# Patient Record
Sex: FEMALE | Race: WHITE | Employment: FULL TIME | URBAN - METROPOLITAN AREA
[De-identification: names, ages, dates, MRNs, and addresses within clinical notes are randomized per-mention and may not be internally consistent; named-entity substitution may affect disease eponyms.]

---

## 2018-08-02 ENCOUNTER — OFFICE VISIT (OUTPATIENT)
Dept: INTERNAL MEDICINE CLINIC | Age: 48
End: 2018-08-02

## 2018-08-02 VITALS
HEART RATE: 62 BPM | RESPIRATION RATE: 18 BRPM | BODY MASS INDEX: 23.14 KG/M2 | WEIGHT: 138.9 LBS | TEMPERATURE: 98 F | SYSTOLIC BLOOD PRESSURE: 96 MMHG | DIASTOLIC BLOOD PRESSURE: 62 MMHG | HEIGHT: 65 IN | OXYGEN SATURATION: 98 %

## 2018-08-02 DIAGNOSIS — Z00.00 PREVENTATIVE HEALTH CARE: Primary | ICD-10-CM

## 2018-08-02 NOTE — PROGRESS NOTES
1. Have you been to the ER, urgent care clinic since your last visit? Hospitalized since your last visit? No 
 
2. Have you seen or consulted any other health care providers outside of the [facility] since your last visit? Include any pap smears or colon screening. No 
 
Complaints of clogged ears, blurry vision and calluses on L&R foot

## 2018-08-02 NOTE — MR AVS SNAPSHOT
[address] [place] [address]. [address] [id number] 
[phone] Patient: Rupinder Chase MRN: [id number] WMI:[id number] Visit Information Date & Time Provider Department Dept. Phone Encounter #  
 8/2/2018 10:00 AM Mario Keller [facility] [place] [facility] [id number] Follow-up Instructions Return in about 1 year (around 8/2/2019). Follow-up and Disposition History Upcoming Health Maintenance Date Due  
 PAP AKA CERVICAL CYTOLOGY 2/10/1991 Influenza Age 5 to Adult 8/1/2018 DTaP/Tdap/Td series (1 - Tdap) 8/2/2019* *Topic was postponed. The date shown is not the original due date. Allergies as of 8/2/2018  Review Complete On: 8/2/2018 By: Nola Rodney MD  
 No Known Allergies Current Immunizations  Never Reviewed No immunizations on file. Not reviewed this visit You Were Diagnosed With   
  
 Codes Comments Preventative health care    -  Primary ICD-10-CM: Z00.00 ICD-9-CM: V70.0 Vitals BP Pulse Temp Resp Height(growth percentile) Weight(growth percentile) 96/62 62 98 °F (36.7 °C) (Oral) 18 5' 5\" (1.651 m) 138 lb 14.4 oz (63 kg) LMP SpO2 BMI Smoking Status 07/26/2018 98% 23.11 kg/m2 Never Smoker Vitals History BMI and BSA Data Body Mass Index Body Surface Area  
 23.11 kg/m 2 1.7 m 2 Preferred Pharmacy Pharmacy Name Phone [facility] [phone] Your Updated Medication List  
  
   
This list is accurate as of 8/2/18  1:22 PM.  Always use your most recent med list.  
  
  
  
  
 CALCIUM PO Take  by mouth. GLUCOSAMINE COMPLEX PO Take  by mouth. MULTI-VITAMIN PO Take  by mouth. We Performed the Following CBC WITH AUTOMATED DIFF [83724 CPT(R)] COLLECTION VENOUS BLOOD,VENIPUNCTURE [id number] CPT(R)] CORTISOL [id number] CPT(R)] DHEA [09394 CPT(R)] ESTRADIOL [id number] CPT(R)] FOLLICLE STIMULATING HORMONE [66661 CPT(R)] HEMOGLOBIN A1C WITH EAG [06717 CPT(R)] LIPID PANEL [75915 CPT(R)] METABOLIC PANEL, COMPREHENSIVE [79312 CPT(R)] PROGESTERONE [id number] CPT(R)] REFERRAL TO OPHTHALMOLOGY [REF57 Custom] TESTOSTERONE, TOTAL, FEMALE/CHILD [id number] CPT(R)] TSH 3RD GENERATION [52273 CPT(R)] URINALYSIS W/ RFLX MICROSCOPIC [34745 CPT(R)] VITAMIN D, 1, 25 DIHYDROXY [86350 CPT(R)] Follow-up Instructions Return in about 1 year (around 8/2/2019). To-Do List   
 08/02/2018 Imaging:  CRISTOBAL MAMMO BI SCREENING INCL CAD Referral Information Referral ID Referred By Referred To  
  
 [id number] Major Pester E Not Available Visits Status Start Date End Date 1 New Request 8/2/18 8/2/19 If your referral has a status of pending review or denied, additional information will be sent to support the outcome of this decision. Introducing \[facility] [place] [facility]\"" & HEALTH SERVICES! [facility] introduces Flinqer patient portal. Now you can access parts of your medical record, email your doctor's office, and request medication refills online. 1. In your internet browser, go to [email or web address] 2. Click on the First Time User? Click Here link in the Sign In box. You will see the New Member Sign Up page. 3. Enter your Flinqer Access Code exactly as it appears below. You will not need to use this code after youve completed the sign-up process. If you do not sign up before the expiration date, you must request a new code. · Flinqer Access Code: [email or web address] Expires: 10/31/2018  1:22 PM 
 
4. Enter the last four digits of your Social Security Number (xxxx) and Date of Birth (mm/dd/yyyy) as indicated and click Submit. You will be taken to the next sign-up page. 5. Create a CPXi ID. This will be your CPXi login ID and cannot be changed, so think of one that is secure and easy to remember. 6. Create a CPXi password. You can change your password at any time. 7. Enter your Password Reset Question and Answer. This can be used at a later time if you forget your password. 8. Enter your e-mail address. You will receive e-mail notification when new information is available in [address] 9. Click Sign Up. You can now view and download portions of your medical record. 10. Click the Download Summary menu link to download a portable copy of your medical information. If you have questions, please visit the Frequently Asked Questions section of the CPXi website. Remember, CPXi is NOT to be used for urgent needs. For medical emergencies, dial 911. Now available from your iPhone and Android! Please provide this summary of care documentation to your next provider. Your primary care clinician is listed as Kyaw Hodges. If you have any questions after today's visit, please call [phone].

## 2018-08-02 NOTE — PROGRESS NOTES
[facility] Charles Ramsey MD, [address] [address] Phone:  [phone]  Fax: [phone] Chief Complaint Patient presents with [address] [place] [address] SUBJECTIVE: 
 
Patrisha Opitz is a 50 y.o. female  Dictation on: 08/02/2018 11:53 AM by: Gerardo Johnson [[id number]] Dictation on: 08/02/2018 11:55 AM by: Gerardo Johnson [[id number]] Current Outpatient Prescriptions Medication Sig Dispense Refill  MULTIVITAMINS WITH FLUORIDE (MULTI-VITAMIN PO) Take  by mouth.  CALCIUM PO Take  by mouth.  GLUCOSAMINE HCL/GLUC ALMEIDA (GLUCOSAMINE COMPLEX PO) Take  by mouth. Past Medical History:  
Diagnosis Date  Abdominal pain, other specified site 9/25/2012  [facility] care  S/P laparoscopic appendectomy 11/7/2012 Past Surgical History:  
Procedure Laterality Date  HX APPENDECTOMY  8/18/12  
 laparocopic  HX OTHER SURGICAL    
 tonsillectomy No Known Allergies REVIEW OF SYSTEMS: 
General: negative for - chills or fever ENT: negative for - headaches, nasal congestion or tinnitus Respiratory: negative for - cough, hemoptysis, shortness of breath or wheezing Cardiovascular : negative for - chest pain, edema, palpitations or shortness of breath Gastrointestinal: negative for - abdominal pain, blood in stools, heartburn or nausea/vomiting Genito-Urinary: no dysuria, trouble voiding, or hematuria Musculoskeletal: negative for - gait disturbance, joint pain, joint stiffness or joint swelling Neurological: no TIA or stroke symptoms Hematologic: no bruises, no bleeding, no swollen glands Integument: no lumps, mole changes, nail changes or rash Endocrine:no malaise/lethargy or unexpected weight changes Social History Social History  Marital status: SINGLE Spouse name: N/A  
 Number of children: N/A  
 Years of education: N/A Social History Main Topics  Smoking status: Never Smoker  Smokeless tobacco: Never Used  Alcohol use No  
 Drug use: No  
 Sexual activity: Yes  
  Partners: Male Birth control/ protection: None Other Topics Concern  None Social History Narrative Family History Problem Relation Age of Onset  Hypertension Mother  Hypertension Father Dictation on: 08/02/2018 11:58 AM by: Virginia Hoffmann [[id number]] OBJECTIVE:  
 
Visit Vitals  BP 96/62  Pulse 62  Temp 98 °F (36.7 °C) (Oral)  Resp 18  Ht 5' 5\" (1.651 m)  Wt 138 lb 14.4 oz (63 kg)  LMP 07/26/2018  SpO2 98%  BMI 23.11 kg/m2 CONSTITUTIONAL: well , well nourished, appears age appropriate EYES: perrla, eom intact ENMT:moist mucous membranes, pharynx clear NECK: supple. Thyroid normal 
RESPIRATORY: Chest: clear bilaterally CARDIOVASCULAR: Heart: regular rate and rhythm GASTROINTESTINAL: Abdomen: soft, bowel sounds active HEMATOLOGIC: no pathological lymph nodes palpated MUSCULOSKELETAL: Extremities: no edema, pulse 1+ INTEGUMENT: No unusual rashes or suspicious skin lesions noted. Nails appear normal. 
NEUROLOGIC: non-focal exam  
MENTAL STATUS: alert and oriented, appropriate affect No visits with results within 3 Month(s) from this visit. Latest known visit with results is: 
 
Admission on 08/18/2012, Discharged on 08/18/2012 Component Date Value Ref Range Status  WBC 08/18/2012 4.3  3.6 - 11.0 K/uL Final  
 RBC 08/18/2012 4.51  3.80 - 5.20 M/uL Final  
 HGB 08/18/2012 13.6  11.5 - 16.0 g/dL Final  
 HCT 08/18/2012 40.3  35.0 - 47.0 % Final  
 MCV 08/18/2012 89.4  80.0 - 99.0 FL Final  
 MCH 08/18/2012 30.2  26.0 - 34.0 PG Final  
 MCHC 08/18/2012 33.7  30.0 - 36.5 g/dL Final  
 RDW 08/18/2012 13.7  11.5 - 14.5 % Final  
 PLATELET 56/81/6977 439* 150 - 400 K/uL Final  
 NEUTROPHILS 08/18/2012 49  32 - 75 % Final  
 LYMPHOCYTES 08/18/2012 41  12 - 49 % Final  
 MONOCYTES 08/18/2012 7  5 - 13 % Final  
 EOSINOPHILS 08/18/2012 2  0 - 7 % Final  
 BASOPHILS 08/18/2012 1  0 - 1 % Final  
 ABS. NEUTROPHILS 08/18/2012 2.1  1.8 - 8.0 K/UL Final  
 ABS. LYMPHOCYTES 08/18/2012 1.8  0.8 - 3.5 K/UL Final  
 ABS. MONOCYTES 08/18/2012 0.3  0.0 - 1.0 K/UL Final  
 ABS. EOSINOPHILS 08/18/2012 0.1  0.0 - 0.4 K/UL Final  
 ABS. BASOPHILS 08/18/2012 0.0  0.0 - 0.1 K/UL Final  
 Sodium 08/18/2012 142  136 - 145 MMOL/L Final  
 Potassium 08/18/2012 4.1  3.5 - 5.1 MMOL/L Final  
 Chloride 08/18/2012 109* 97 - 108 MMOL/L Final  
 CO2 08/18/2012 26  21 - 32 MMOL/L Final  
 Anion gap 08/18/2012 7  5 - 15 mmol/L Final  
 Glucose 08/18/2012 83  65 - 100 MG/DL Final  
 BUN 08/18/2012 25* 6 - 20 MG/DL Final  
 Creatinine 08/18/2012 0.7  0.6 - 1.3 MG/DL Final  
 BUN/Creatinine ratio 08/18/2012 36* 12 - 20   Final  
 GFR est AA 08/18/2012 >60  >60 ml/min/1.73m2 Final  
 GFR est non-AA 08/18/2012 >60  >60 ml/min/1.73m2 Final  
 Comment: (NOTE) Estimated GFR is calculated using the Modification of Diet in Renal   
                        Disease (MDRD) Study equation, reported for both  [facility] [place]) and non- Americans (GFRNA), and normalized to 1.73m2   
                        body surface area. The physician must decide which value applies to   
                        the patient. The MDRD study equation should only be used in   
                        individuals age 25 or older. It has not been validated for the   
                        following: pregnant women, patients with serious comorbid conditions,   
                        or on certain medications, or persons with extremes of body size,   
                        muscle mass, or nutritional status.   
 Calcium 08/18/2012 8.7  8.5 - 10.1 MG/DL Final  
 Bilirubin, total 08/18/2012 0.4  0.2 - 1.0 MG/DL Final  
 ALT (SGPT) 08/18/2012 28  12 - 78 U/L Final  
 AST (SGOT) 08/18/2012 27  15 - 37 U/L Final  
 Alk. phosphatase 08/18/2012 54  50 - 136 U/L Final  
 Protein, total 08/18/2012 6.6  6.4 - 8.2 g/dL Final  
 Albumin 08/18/2012 3.5  3.5 - 5.0 g/dL Final  
 Globulin 08/18/2012 3.1  2.0 - 4.0 g/dL Final  
 A-G Ratio 08/18/2012 1.1  1.1 - 2.2   Final  
 Magnesium 08/18/2012 2.2  1.6 - 2.4 MG/DL Final  
 Phosphorus 08/18/2012 3.9  2.5 - 4.9 MG/DL Final  
 Color 08/18/2012 YELLOW   Final  
 Appearance 08/18/2012 CLEAR   Final  
 Specific gravity 08/18/2012 1.020  1.003 - 1.030   Final  
 pH (UA) 08/18/2012 7.0  5.0 - 8.0   Final  
 Protein 08/18/2012 NEGATIVE   NEGATIVE MG/DL Final  
 Glucose 08/18/2012 NEGATIVE   NEGATIVE MG/DL Final  
 Ketone 08/18/2012 NEGATIVE   NEGATIVE MG/DL Final  
 Bilirubin 08/18/2012 NEGATIVE   NEGATIVE Final  
 Blood 08/18/2012 NEGATIVE   NEGATIVE Final  
 Urobilinogen 08/18/2012 0.2  0.2 - 1.0 EU/DL Final  
 Nitrites 08/18/2012 NEGATIVE   NEGATIVE Final  
 Leukocyte Esterase 08/18/2012 NEGATIVE   NEGATIVE Final  
 UA:UC IF INDICATED 08/18/2012 CULTURE NOT INDICATED BY UA RESULT   Final  
 WBC 08/18/2012 0-4  0 - 4 /HPF Final  
 RBC 08/18/2012 0-3  0 - 5 /HPF Final  
 Epithelial cells 08/18/2012 0-5  0 - 5 /LPF Final  
 Bacteria 08/18/2012 NEGATIVE   NEGATIVE /HPF Final  
 Hyaline cast 08/18/2012 0-2  0 - 2 Final  
 Lipase 08/18/2012 123  73 - 393 U/L Final  
 Amylase 08/18/2012 51  25 - 115 U/L Final  
 Pregnancy test,urine (POC) 08/18/2012 NEGATIVE   NEGATIVE Final  
 
 
ASSESSMENT:  
1. Preventative health care Dictation on: 08/02/2018 12:06 PM by: Fermin Brandt [[id number]] I have discussed the diagnosis with the patient and the intended plan as seen in the 
orders above. The patient understands and agees with the plan. The patient has  
received an after visit summary and questions were answered concerning 
future plans Patient labs and/or xrays were reviewed Past records were reviewed. PLAN: 
. Orders Placed This Encounter  CRISTOBAL MAMMO BI SCREENING INCL CAD  
 URINALYSIS W/ RFLX MICROSCOPIC  CBC WITH AUTOMATED DIFF  
 METABOLIC PANEL, COMPREHENSIVE  LIPID PANEL  
 TSH 3RD GENERATION  
 HEMOGLOBIN A1C WITH EAG  
 TESTOSTERONE, TOTAL, FEMALE/CHILD  
 ESTRADIOL  VITAMIN D, 1, 25 DIHYDROXY  
 CORTISOL  FOLLICLE STIMULATING HORMONE  DHEA  
 PROGESTERONE  REFERRAL TO OPHTHALMOLOGY Follow-up Disposition: 
Return in about 1 year (around 8/2/2019). ATTENTION:  
This medical record was transcribed using an electronic medical records system. Although proofread, it may and can contain electronic and spelling errors. Other human spelling and other errors may be present. Corrections may be executed at a later time. Please feel free to contact us for any clarifications as needed.

## 2018-08-06 LAB
1,25(OH)2D3 SERPL-MCNC: 69.7 PG/ML (ref 19.9–79.3)
ALBUMIN SERPL-MCNC: 4.5 G/DL (ref 3.5–5.5)
ALBUMIN/GLOB SERPL: 1.9 {RATIO} (ref 1.2–2.2)
ALP SERPL-CCNC: 62 IU/L (ref 39–117)
ALT SERPL-CCNC: 17 IU/L (ref 0–32)
APPEARANCE UR: CLEAR
AST SERPL-CCNC: 29 IU/L (ref 0–40)
BASOPHILS # BLD AUTO: 0 X10E3/UL (ref 0–0.2)
BASOPHILS NFR BLD AUTO: 1 %
BILIRUB SERPL-MCNC: 0.5 MG/DL (ref 0–1.2)
BILIRUB UR QL STRIP: NEGATIVE
BUN SERPL-MCNC: 18 MG/DL (ref 6–24)
BUN/CREAT SERPL: 22 (ref 9–23)
CALCIUM SERPL-MCNC: 9.7 MG/DL (ref 8.7–10.2)
CHLORIDE SERPL-SCNC: 99 MMOL/L (ref 96–106)
CHOLEST SERPL-MCNC: 213 MG/DL (ref 100–199)
CO2 SERPL-SCNC: 26 MMOL/L (ref 20–29)
COLOR UR: YELLOW
CORTIS SERPL-MCNC: 8 UG/DL
CREAT SERPL-MCNC: 0.81 MG/DL (ref 0.57–1)
DHEA SERPL-MCNC: 198 NG/DL (ref 31–701)
EOSINOPHIL # BLD AUTO: 0.1 X10E3/UL (ref 0–0.4)
EOSINOPHIL NFR BLD AUTO: 1 %
ERYTHROCYTE [DISTWIDTH] IN BLOOD BY AUTOMATED COUNT: 12.9 % (ref 12.3–15.4)
EST. AVERAGE GLUCOSE BLD GHB EST-MCNC: 114 MG/DL
ESTRADIOL SERPL-MCNC: 101.2 PG/ML
FSH SERPL-ACNC: 7.1 MIU/ML
GLOBULIN SER CALC-MCNC: 2.4 G/DL (ref 1.5–4.5)
GLUCOSE SERPL-MCNC: 85 MG/DL (ref 65–99)
GLUCOSE UR QL: NEGATIVE
HBA1C MFR BLD: 5.6 % (ref 4.8–5.6)
HCT VFR BLD AUTO: 42.9 % (ref 34–46.6)
HDLC SERPL-MCNC: 81 MG/DL
HGB BLD-MCNC: 13.8 G/DL (ref 11.1–15.9)
HGB UR QL STRIP: NEGATIVE
IMM GRANULOCYTES # BLD: 0 X10E3/UL (ref 0–0.1)
IMM GRANULOCYTES NFR BLD: 0 %
KETONES UR QL STRIP: NEGATIVE
LDLC SERPL CALC-MCNC: 121 MG/DL (ref 0–99)
LEUKOCYTE ESTERASE UR QL STRIP: NEGATIVE
LYMPHOCYTES # BLD AUTO: 1.7 X10E3/UL (ref 0.7–3.1)
LYMPHOCYTES NFR BLD AUTO: 32 %
MCH RBC QN AUTO: 29.9 PG (ref 26.6–33)
MCHC RBC AUTO-ENTMCNC: 32.2 G/DL (ref 31.5–35.7)
MCV RBC AUTO: 93 FL (ref 79–97)
MICRO URNS: NORMAL
MONOCYTES # BLD AUTO: 0.3 X10E3/UL (ref 0.1–0.9)
MONOCYTES NFR BLD AUTO: 6 %
NEUTROPHILS # BLD AUTO: 3.2 X10E3/UL (ref 1.4–7)
NEUTROPHILS NFR BLD AUTO: 60 %
NITRITE UR QL STRIP: NEGATIVE
PH UR STRIP: 6.5 [PH] (ref 5–7.5)
PLATELET # BLD AUTO: 185 X10E3/UL (ref 150–379)
POTASSIUM SERPL-SCNC: 4.5 MMOL/L (ref 3.5–5.2)
PROGEST SERPL-MCNC: 0.3 NG/ML
PROT SERPL-MCNC: 6.9 G/DL (ref 6–8.5)
PROT UR QL STRIP: NEGATIVE
RBC # BLD AUTO: 4.62 X10E6/UL (ref 3.77–5.28)
SODIUM SERPL-SCNC: 139 MMOL/L (ref 134–144)
SP GR UR: 1.02 (ref 1–1.03)
TESTOST SERPL-MCNC: 130.3 NG/DL
TRIGL SERPL-MCNC: 57 MG/DL (ref 0–149)
TSH SERPL DL<=0.005 MIU/L-ACNC: 2.02 UIU/ML (ref 0.45–4.5)
UROBILINOGEN UR STRIP-MCNC: 0.2 MG/DL (ref 0.2–1)
VLDLC SERPL CALC-MCNC: 11 MG/DL (ref 5–40)
WBC # BLD AUTO: 5.3 X10E3/UL (ref 3.4–10.8)

## 2018-08-09 ENCOUNTER — HOSPITAL ENCOUNTER (OUTPATIENT)
Dept: MAMMOGRAPHY | Age: 48
Discharge: HOME | End: 2018-08-09
Attending: INTERNAL MEDICINE
Payer: COMMERCIAL

## 2018-08-09 DIAGNOSIS — Z00.00 PREVENTATIVE HEALTH CARE: ICD-10-CM

## 2018-08-09 PROCEDURE — 77067 SCR MAMMO BI INCL CAD: CPT

## 2018-08-09 NOTE — PROGRESS NOTES
The patient comes in today for a physical exam and ongoing care. She denies any specific complaints and is seen for evaluation.

## 2018-08-09 NOTE — PROGRESS NOTES
The patient's medical status is stable. Blood pressure control  is at goal.    Her BMI reflects ideal body weight. Exam would suggest  I assume as her pulse is down in the 50s  when I listen to her today. We encouraged her to continue her  workouts and physical activity. She has requested some hormonal studies, which will be ordered. We will send her the results in the mail. We encouraged her to  come and see us whenever she has a problem. If she is unable to  get an appointment, she can walk in. If she has a dire  emergency, she can use [facility]'s emergency room. She will see  us on a yearly basis.

## 2018-08-09 NOTE — PROGRESS NOTES
Habits:  She is a lifetime nonsmoker, nondrinker, non-drug-  abuser. Social History:  The patient is  and lives with her  , who we saw earlier. She is the mother of a 80-year-old  son and a 10year-old daughter. 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 She does  work at [employer] as well as [employer]. [employer] background  is [place]. Family History:  Father is 80. Mother  at 68 of  complications motor vehicle accident. She also had lymphoma. Four siblings. She is the fourth of five.